# Patient Record
Sex: FEMALE | Race: WHITE
[De-identification: names, ages, dates, MRNs, and addresses within clinical notes are randomized per-mention and may not be internally consistent; named-entity substitution may affect disease eponyms.]

---

## 2020-01-01 ENCOUNTER — HOSPITAL ENCOUNTER (EMERGENCY)
Dept: HOSPITAL 4 - SED | Age: 35
Discharge: HOME | End: 2020-01-01
Payer: COMMERCIAL

## 2020-01-01 VITALS — BODY MASS INDEX: 21.71 KG/M2 | WEIGHT: 115 LBS | HEIGHT: 61 IN

## 2020-01-01 VITALS — SYSTOLIC BLOOD PRESSURE: 117 MMHG

## 2020-01-01 DIAGNOSIS — N39.0: Primary | ICD-10-CM

## 2020-01-01 DIAGNOSIS — Z88.0: ICD-10-CM

## 2020-01-01 NOTE — NUR
Patient given written and verbal discharge instructions and verbalizes 
understanding.  ER MD Dr. Rowley discussed with patient the results and treatment 
provided. Patient in stable condition. ID arm band removed. 

Rx of pyridium and cipro given. Patient educated on pain management and to 
follow up with PMD. Pain Scale 0/10.

Opportunity for questions provided and answered. Medication side effect fact 
sheet provided.

## 2020-01-01 NOTE — NUR
Patient complaining of dysuria at midnight tonight. Pain 6/10. No other 
complaints/injuries per patient or as noted. Will continue to monitor.

## 2023-08-31 LAB
CHLAMYDIA TRACH., AMPLIFIED: NEGATIVE
N. GONORRHEA, AMPLIFIED: NEGATIVE
TRICHOMONAS VAGINALIS: NEGATIVE

## 2025-02-21 ENCOUNTER — OFFICE VISIT (OUTPATIENT)
Dept: URGENT CARE | Age: 40
End: 2025-02-21
Payer: COMMERCIAL

## 2025-02-21 VITALS
SYSTOLIC BLOOD PRESSURE: 102 MMHG | DIASTOLIC BLOOD PRESSURE: 70 MMHG | RESPIRATION RATE: 16 BRPM | HEART RATE: 81 BPM | TEMPERATURE: 98.6 F | OXYGEN SATURATION: 98 %

## 2025-02-21 DIAGNOSIS — R68.89 FLU-LIKE SYMPTOMS: ICD-10-CM

## 2025-02-21 DIAGNOSIS — J06.9 VIRAL URI: Primary | ICD-10-CM

## 2025-02-21 LAB
POC RAPID INFLUENZA A: NEGATIVE
POC RAPID INFLUENZA B: NEGATIVE
POC SARS-COV-2 AG BINAX: NORMAL

## 2025-02-21 RX ORDER — DEXTROAMPHETAMINE SACCHARATE, AMPHETAMINE ASPARTATE MONOHYDRATE, DEXTROAMPHETAMINE SULFATE AND AMPHETAMINE SULFATE 1.25; 1.25; 1.25; 1.25 MG/1; MG/1; MG/1; MG/1
5 CAPSULE, EXTENDED RELEASE ORAL EVERY MORNING
COMMUNITY

## 2025-02-21 ASSESSMENT — ENCOUNTER SYMPTOMS
FATIGUE: 0
AGITATION: 0
DIARRHEA: 0
FEVER: 1
NAUSEA: 0
COUGH: 0
DIZZINESS: 0
VOICE CHANGE: 0
CHEST TIGHTNESS: 0
RHINORRHEA: 1
CHILLS: 1
SHORTNESS OF BREATH: 0
SORE THROAT: 0
SINUS PRESSURE: 0
SINUS PAIN: 0
ABDOMINAL PAIN: 0
HEADACHES: 0
VOMITING: 0
CONFUSION: 0

## 2025-02-21 NOTE — LETTER
February 21, 2025     Patient: Imani Garcia   YOB: 1985   Date of Visit: 2/21/2025       To Whom It May Concern:    Imani Garcia was seen in my clinic on 2/21/2025 at 8:00 am. Please excuse Imani for her absence from work on this day to make the appointment.    If you have any questions or concerns, please don't hesitate to call.         Sincerely,         Lucila Martinez PA-C        CC: No Recipients

## 2025-02-21 NOTE — PROGRESS NOTES
Subjective   Patient ID: Imani Garcia is a 39 y.o. female. They present today with a chief complaint of Generalized Body Aches (X 1 day).    History of Present Illness  Pt reports flu like symptoms. Body aches, fever, chills. Symptoms started last night. Denies exposure.           Past Medical History  Allergies as of 02/21/2025 - never reviewed   Allergen Reaction Noted    Penicillin g Unknown 02/21/2025       (Not in a hospital admission)       No past medical history on file.    No past surgical history on file.         Review of Systems  Review of Systems   Constitutional:  Positive for chills and fever. Negative for fatigue.   HENT:  Positive for congestion and rhinorrhea. Negative for sinus pressure, sinus pain, sneezing, sore throat and voice change.    Respiratory:  Negative for cough, chest tightness and shortness of breath.    Cardiovascular:  Negative for chest pain.   Gastrointestinal:  Negative for abdominal pain, diarrhea, nausea and vomiting.   Skin:  Negative for rash.   Neurological:  Negative for dizziness and headaches.   Psychiatric/Behavioral:  Negative for agitation and confusion.                                   Objective    Vitals:    02/21/25 0807   BP: 102/70   Pulse: 81   Resp: 16   Temp: 37 °C (98.6 °F)   SpO2: 98%     No LMP recorded.    Physical Exam  Constitutional:       Appearance: Normal appearance.   HENT:      Head: Normocephalic and atraumatic.      Right Ear: Tympanic membrane, ear canal and external ear normal.      Left Ear: Tympanic membrane, ear canal and external ear normal.      Nose: Nose normal. No congestion or rhinorrhea.      Mouth/Throat:      Pharynx: No posterior oropharyngeal erythema.   Cardiovascular:      Rate and Rhythm: Normal rate and regular rhythm.      Heart sounds: No murmur heard.  Pulmonary:      Effort: Pulmonary effort is normal.      Breath sounds: Normal breath sounds. No wheezing.   Abdominal:      General: Abdomen is flat.      Palpations:  Abdomen is soft.   Musculoskeletal:         General: Normal range of motion.   Neurological:      Mental Status: She is alert and oriented to person, place, and time.   Psychiatric:         Mood and Affect: Mood normal.         Procedures    Point of Care Test & Imaging Results from this visit  Results for orders placed or performed in visit on 02/21/25   POCT Covid-19 Rapid Antigen   Result Value Ref Range    POC FINN-COV-2 AG  Presumptive negative test for SARS-CoV-2 (no antigen detected)     Presumptive negative test for SARS-CoV-2 (no antigen detected)   POCT Influenza A/B manually resulted   Result Value Ref Range    POC Rapid Influenza A Negative Negative    POC Rapid Influenza B Negative Negative      No results found.    Diagnostic study results (if any) were reviewed by Lucila Martinez PA-C.    Assessment/Plan   Allergies, medications, history, and pertinent labs/EKGs/Imaging reviewed by Lucila Martinez PA-C.     Medical Decision Making  NAD, VSS, lung CTAB  Possible viral syndrome, POC tests negative, cannot completely rule out false negative based on symptoms but no signs of medical emergency condition    Orders and Diagnoses  Diagnoses and all orders for this visit:  Viral URI  Flu-like symptoms  -     POCT Covid-19 Rapid Antigen  -     POCT Influenza A/B manually resulted      Medical Admin Record      Patient disposition: Home    Electronically signed by Lucila Martinez PA-C  8:29 AM